# Patient Record
Sex: FEMALE | Race: ASIAN | NOT HISPANIC OR LATINO | URBAN - METROPOLITAN AREA
[De-identification: names, ages, dates, MRNs, and addresses within clinical notes are randomized per-mention and may not be internally consistent; named-entity substitution may affect disease eponyms.]

---

## 2018-08-28 ENCOUNTER — EMERGENCY (EMERGENCY)
Facility: HOSPITAL | Age: 28
LOS: 1 days | Discharge: HOME | End: 2018-08-28
Attending: EMERGENCY MEDICINE | Admitting: EMERGENCY MEDICINE
Payer: COMMERCIAL

## 2018-08-28 VITALS
HEART RATE: 86 BPM | OXYGEN SATURATION: 100 % | RESPIRATION RATE: 18 BRPM | DIASTOLIC BLOOD PRESSURE: 64 MMHG | SYSTOLIC BLOOD PRESSURE: 109 MMHG

## 2018-08-28 VITALS
DIASTOLIC BLOOD PRESSURE: 81 MMHG | SYSTOLIC BLOOD PRESSURE: 119 MMHG | TEMPERATURE: 99 F | HEART RATE: 71 BPM | RESPIRATION RATE: 18 BRPM

## 2018-08-28 LAB
ALBUMIN SERPL ELPH-MCNC: 4.5 G/DL — SIGNIFICANT CHANGE UP (ref 3.3–5)
ALP SERPL-CCNC: 47 U/L — SIGNIFICANT CHANGE UP (ref 40–120)
ALT FLD-CCNC: 14 U/L — SIGNIFICANT CHANGE UP (ref 4–33)
APPEARANCE UR: CLEAR — SIGNIFICANT CHANGE UP
AST SERPL-CCNC: 22 U/L — SIGNIFICANT CHANGE UP (ref 4–32)
BASOPHILS # BLD AUTO: 0.03 K/UL — SIGNIFICANT CHANGE UP (ref 0–0.2)
BASOPHILS NFR BLD AUTO: 0.3 % — SIGNIFICANT CHANGE UP (ref 0–2)
BILIRUB SERPL-MCNC: 0.2 MG/DL — SIGNIFICANT CHANGE UP (ref 0.2–1.2)
BILIRUB UR-MCNC: NEGATIVE — SIGNIFICANT CHANGE UP
BLD GP AB SCN SERPL QL: NEGATIVE — SIGNIFICANT CHANGE UP
BLOOD UR QL VISUAL: NEGATIVE — SIGNIFICANT CHANGE UP
BUN SERPL-MCNC: 15 MG/DL — SIGNIFICANT CHANGE UP (ref 7–23)
CALCIUM SERPL-MCNC: 9.4 MG/DL — SIGNIFICANT CHANGE UP (ref 8.4–10.5)
CHLORIDE SERPL-SCNC: 104 MMOL/L — SIGNIFICANT CHANGE UP (ref 98–107)
CO2 SERPL-SCNC: 24 MMOL/L — SIGNIFICANT CHANGE UP (ref 22–31)
COLOR SPEC: SIGNIFICANT CHANGE UP
CREAT SERPL-MCNC: 0.65 MG/DL — SIGNIFICANT CHANGE UP (ref 0.5–1.3)
EOSINOPHIL # BLD AUTO: 0.09 K/UL — SIGNIFICANT CHANGE UP (ref 0–0.5)
EOSINOPHIL NFR BLD AUTO: 1 % — SIGNIFICANT CHANGE UP (ref 0–6)
GLUCOSE SERPL-MCNC: 97 MG/DL — SIGNIFICANT CHANGE UP (ref 70–99)
GLUCOSE UR-MCNC: NEGATIVE — SIGNIFICANT CHANGE UP
HCG SERPL-ACNC: < 5 MIU/ML — SIGNIFICANT CHANGE UP
HCT VFR BLD CALC: 37.1 % — SIGNIFICANT CHANGE UP (ref 34.5–45)
HGB BLD-MCNC: 12.2 G/DL — SIGNIFICANT CHANGE UP (ref 11.5–15.5)
IMM GRANULOCYTES # BLD AUTO: 0.04 # — SIGNIFICANT CHANGE UP
IMM GRANULOCYTES NFR BLD AUTO: 0.4 % — SIGNIFICANT CHANGE UP (ref 0–1.5)
KETONES UR-MCNC: NEGATIVE — SIGNIFICANT CHANGE UP
LEUKOCYTE ESTERASE UR-ACNC: NEGATIVE — SIGNIFICANT CHANGE UP
LYMPHOCYTES # BLD AUTO: 2.24 K/UL — SIGNIFICANT CHANGE UP (ref 1–3.3)
LYMPHOCYTES # BLD AUTO: 24 % — SIGNIFICANT CHANGE UP (ref 13–44)
MCHC RBC-ENTMCNC: 28.6 PG — SIGNIFICANT CHANGE UP (ref 27–34)
MCHC RBC-ENTMCNC: 32.9 % — SIGNIFICANT CHANGE UP (ref 32–36)
MCV RBC AUTO: 86.9 FL — SIGNIFICANT CHANGE UP (ref 80–100)
MONOCYTES # BLD AUTO: 0.61 K/UL — SIGNIFICANT CHANGE UP (ref 0–0.9)
MONOCYTES NFR BLD AUTO: 6.5 % — SIGNIFICANT CHANGE UP (ref 2–14)
NEUTROPHILS # BLD AUTO: 6.32 K/UL — SIGNIFICANT CHANGE UP (ref 1.8–7.4)
NEUTROPHILS NFR BLD AUTO: 67.8 % — SIGNIFICANT CHANGE UP (ref 43–77)
NITRITE UR-MCNC: NEGATIVE — SIGNIFICANT CHANGE UP
NRBC # FLD: 0 — SIGNIFICANT CHANGE UP
PH UR: 7 — SIGNIFICANT CHANGE UP (ref 5–8)
PLATELET # BLD AUTO: 172 K/UL — SIGNIFICANT CHANGE UP (ref 150–400)
PMV BLD: 11.6 FL — SIGNIFICANT CHANGE UP (ref 7–13)
POTASSIUM SERPL-MCNC: 4.3 MMOL/L — SIGNIFICANT CHANGE UP (ref 3.5–5.3)
POTASSIUM SERPL-SCNC: 4.3 MMOL/L — SIGNIFICANT CHANGE UP (ref 3.5–5.3)
PROT SERPL-MCNC: 7.6 G/DL — SIGNIFICANT CHANGE UP (ref 6–8.3)
PROT UR-MCNC: NEGATIVE — SIGNIFICANT CHANGE UP
RBC # BLD: 4.27 M/UL — SIGNIFICANT CHANGE UP (ref 3.8–5.2)
RBC # FLD: 12 % — SIGNIFICANT CHANGE UP (ref 10.3–14.5)
RH IG SCN BLD-IMP: POSITIVE — SIGNIFICANT CHANGE UP
SODIUM SERPL-SCNC: 139 MMOL/L — SIGNIFICANT CHANGE UP (ref 135–145)
SP GR SPEC: 1.01 — SIGNIFICANT CHANGE UP (ref 1–1.04)
UROBILINOGEN FLD QL: NORMAL — SIGNIFICANT CHANGE UP
WBC # BLD: 9.33 K/UL — SIGNIFICANT CHANGE UP (ref 3.8–10.5)
WBC # FLD AUTO: 9.33 K/UL — SIGNIFICANT CHANGE UP (ref 3.8–10.5)

## 2018-08-28 PROCEDURE — 76830 TRANSVAGINAL US NON-OB: CPT | Mod: 26

## 2018-08-28 PROCEDURE — 99285 EMERGENCY DEPT VISIT HI MDM: CPT

## 2018-08-28 RX ORDER — KETOROLAC TROMETHAMINE 30 MG/ML
15 SYRINGE (ML) INJECTION ONCE
Qty: 0 | Refills: 0 | Status: DISCONTINUED | OUTPATIENT
Start: 2018-08-28 | End: 2018-08-28

## 2018-08-28 RX ORDER — SODIUM CHLORIDE 9 MG/ML
1000 INJECTION INTRAMUSCULAR; INTRAVENOUS; SUBCUTANEOUS ONCE
Qty: 0 | Refills: 0 | Status: COMPLETED | OUTPATIENT
Start: 2018-08-28 | End: 2018-08-28

## 2018-08-28 RX ADMIN — SODIUM CHLORIDE 1000 MILLILITER(S): 9 INJECTION INTRAMUSCULAR; INTRAVENOUS; SUBCUTANEOUS at 17:02

## 2018-08-28 RX ADMIN — SODIUM CHLORIDE 1000 MILLILITER(S): 9 INJECTION INTRAMUSCULAR; INTRAVENOUS; SUBCUTANEOUS at 21:20

## 2018-08-28 RX ADMIN — Medication 15 MILLIGRAM(S): at 21:19

## 2018-08-28 NOTE — ED PROVIDER NOTE - CARE PLAN
Principal Discharge DX:	Ovarian cyst  Assessment and plan of treatment:	Advance activity as tolerated.  Continue all previously prescribed medications as directed unless otherwise instructed.  Take Motrin (also sold as Advil or Ibuprofen) 400-600 mg (two or three 200 mg over the counter pills) every 8 hours as needed for moderate pain or fevers-- take with food. Take Tylenol 650mg (Two 325 mg pills) every 4-6 hours as needed for pain or fevers. Follow up with your primary care physician and OB/GYN in 48-72 hours- bring copies of your results.  Return to the ER for worsening or persistent symptoms, including but not limited to worsening/persistent pain, fevers, and/or ANY NEW OR CONCERNING SYMPTOMS. If you have issues obtaining follow up, please call: [phone] to obtain a doctor or specialist who takes your insurance in your area.  You may call [phone] to make an appointment with the internal medicine clinic.

## 2018-08-28 NOTE — ED PROVIDER NOTE - PLAN OF CARE
Advance activity as tolerated.  Continue all previously prescribed medications as directed unless otherwise instructed.  Take Motrin (also sold as Advil or Ibuprofen) 400-600 mg (two or three 200 mg over the counter pills) every 8 hours as needed for moderate pain or fevers-- take with food. Take Tylenol 650mg (Two 325 mg pills) every 4-6 hours as needed for pain or fevers. Follow up with your primary care physician and OB/GYN in 48-72 hours- bring copies of your results.  Return to the ER for worsening or persistent symptoms, including but not limited to worsening/persistent pain, fevers, and/or ANY NEW OR CONCERNING SYMPTOMS. If you have issues obtaining follow up, please call: [phone] to obtain a doctor or specialist who takes your insurance in your area.  You may call [phone] to make an appointment with the internal medicine clinic.

## 2018-08-28 NOTE — ED PROVIDER NOTE - MEDICAL DECISION MAKING DETAILS
Pt is a 27 y/o F nonsmoker no PMHx  p/w vaginal bleeding x 1 hr. -- possible miscarriage, possible ectopic pregnancy, although pt had bedside sono yesterday with ob/gyn and was not notified of ectopic pregnancy, but had supposed visualized "sac" -- labs, type and screen, ua, ucx, transvaginal sono

## 2018-08-28 NOTE — ED PROVIDER NOTE - CHPI ED SYMPTOMS NEG
no back pain/no chills/no vaginal discharge/no vomiting/no abdominal pain/no nausea/no pain/no discharge/no dysuria/no fever

## 2018-08-28 NOTE — ED PROVIDER NOTE - NONTENDER LOCATION
left upper quadrant/right upper quadrant/right lower quadrant/umbilical/right costovertebral angle/periumbilical/left costovertebral angle/suprapubic/left lower quadrant

## 2018-08-28 NOTE — ED PROVIDER NOTE - PROGRESS NOTE DETAILS
[place]: Patient signed out to me.  28F 5 weeks pregnant, sac seen on US yesterday, here with spotting.  Labs significant for beta<5, hgb 12, UA neg, type AB+.  Pending read on TVUS.  Likely missed Ab. ONESIMO CRUZ:  Pt with significant improvement in pain.  VSS.  Sono shows left ovarian cyst.  At this time, not clinically concerning for active ovarian torsion.  OB/GYN consulted who saw patient.  Awaiting final recommendation from OB/GYN at this time.

## 2018-08-28 NOTE — ED PROVIDER NOTE - ATTENDING CONTRIBUTION TO CARE
Dr. Yates: 27 yo female with no sig PMH,  (LMP  with positive home pregnancy test), in ED with low abdominal pain and vaginal spotting.  Pt states that after she had positive pregnancy test at home she saw her PMD Dr. Cortes yesterday.  Dr. Cortes performed TVUS showing a "sac" but was told that it was too early to further characterize.  Today pt received a phone call saying that her "?progesterone" level was low.  Then today pt developed low abdominal cramping ("feels like a period") and vaginal spotting and came to ED for evaluation.  In ED initially pt with reassuring BP and then BP dropped on its own.  Second IV was placed and unofficial FAST was performed showing no obvious free fluid.  BP resolved on its own with IVF and time and pt noted feeling improvement.  Beta was <5 in ED.  On exam pt uncomfortable appearing, in mild distress due to abdominal pain, heart RRR, lungs CTAB, abd suprapubic TTP, extremities without swelling, strength 5/5 in all extremities and skin without rash.

## 2018-08-28 NOTE — ED PROVIDER NOTE - OBJECTIVE STATEMENT
Pt is a 29 y/o F nonsmoker no PMHx  p/w vaginal bleeding x 1 hr.  Pt states LMP 18.  Pt had + home pregnancy test at home, which was confirmed in blood work yesterday with OB/GYN Dr. Cortes.  Pt states she got a call today and was told her Progesterone level was lower than expected.  For past hour, pt states she has had intermittent vaginal spotting, most recently passing a small blood clot.  Denies any fevers, chills, nausea, vomiting, dysuria, cloudy urine, pelvic pain, vaginal discharge, abdominal pain, lightheadedness, use of blood thinners, melena, brbpr.  Pt states she had bedside sono yesterday and was told she had a "sac,"

## 2018-08-29 DIAGNOSIS — N93.9 ABNORMAL UTERINE AND VAGINAL BLEEDING, UNSPECIFIED: ICD-10-CM

## 2018-08-29 NOTE — CONSULT NOTE ADULT - SUBJECTIVE AND OBJECTIVE BOX
GYN Consult Note   (Back Note to time of exam at 21:30 due to patient care)    28y  w/ LMP  presents with vaginal bleeding that began this afternoon. Patient reports taking 10 home pregnancy tests, some of which were "weakly positive." She was seen in the office by Dr. Cortes yesterday where she had a bHCG drawn and an ultrasound which, per the patient, showed a possible sac. Patient reports wearing one pad since 12pm but that the bleeding is now like a menstrual period. She denied any pelvic or abdominal pain, however, while patient was here she reported a sudden onset suprapubic cramping pain that lasted about 30 minutes and was associated with dizziness. This resolved spontaneously and the ED did a negative FAST scan. Patient reports no pain at this time. Denies any fevers, chills, chest pain, SOB, N/V, constipation, diarrhea, dysuria.     OB/GYN HISTORY: . Denies any uterine fibroids, ovarian cysts, abnormal pap smears. Reports normal menses q30d lasting ~5 days.     Last Menstrual Period:      Name of GYN Physician: Sophia        PAST MEDICAL & SURGICAL HISTORY:  No pertinent past medical history  No significant past surgical history      REVIEW OF SYSTEMS  +Vaginal bleeding   Denies any fevers, chills, chest pain, SOB, N/V, constipation, diarrhea, dysuria.     MEDICATIONS  (STANDING):    MEDICATIONS  (PRN):      Allergies    No Known Allergies    Intolerances      Vital Signs Last 24 Hrs  T(C): 36.1 (28 Aug 2018 16:18), Max: 37 (28 Aug 2018 14:06)  T(F): 97 (28 Aug 2018 16:18), Max: 98.6 (28 Aug 2018 14:06)  HR: 86 (28 Aug 2018 20:50) (54 - 86)  BP: 109/64 (28 Aug 2018 20:50) (84/50 - 119/81)  BP(mean): --  RR: 18 (28 Aug 2018 20:50) (18 - 18)  SpO2: 100% (28 Aug 2018 20:50) (100% - 100%)    PHYSICAL EXAM:    Constitutional: alert and oriented x 3    Gastrointestinal: soft, non tender, no rebuond or guarding     Genitourinary: blood noted within vault, cleared and os closed; no active hemorrhage or pooling   Cervix: closed/ long, no CMT  Uterus: normal size, non tender  Adnexa: non tender, no palpable masses          LABS:                        12.2   9.33  )-----------( 172      ( 28 Aug 2018 15:22 )             37.1         139  |  104  |  15  ----------------------------<  97  4.3   |  24  |  0.65    Ca    9.4      28 Aug 2018 15:22    TPro  7.6  /  Alb  4.5  /  TBili  0.2  /  DBili  x   /  AST  22  /  ALT  14  /  AlkPhos  47        Urinalysis Basic - ( 28 Aug 2018 15:03 )    Color: LIGHT YELLOW / Appearance: CLEAR / S.010 / pH: 7.0  Gluc: NEGATIVE / Ketone: NEGATIVE  / Bili: NEGATIVE / Urobili: NORMAL   Blood: NEGATIVE / Protein: NEGATIVE / Nitrite: NEGATIVE   Leuk Esterase: NEGATIVE / RBC: x / WBC x   Sq Epi: x / Non Sq Epi: x / Bacteria: x    bHCG <5    RADIOLOGY & ADDITIONAL STUDIES:    < from: US Transvaginal (18 @ 19:27) >  EXAM:  US TRANSVAGINAL        PROCEDURE DATE:  Aug 28 2018         INTERPRETATION:  CLINICAL INFORMATION: Vaginal bleeding, negative serum   beta-hCG    LMP: 2018    COMPARISON: None available.    TECHNIQUE:     Endovaginal pelvic sonogram only. Color and Spectral Doppler was   performed.    FINDINGS:    Uterus: 7.9 x 4.3 x 4.4 cm. Within normal limits.    Endometrium: 14 mm. Within normal limits.    Right ovary: 2.1 x 2.1 x 1.5 cm. Normal blood flow.    Left ovary: 4.3 x 3.7 x 4.3 cm. Normal blood flow. There is a simple   appearing cyst measuring 3.5 x 3.6 x 3.9 cm.    Fluid: None.    IMPRESSION:     Simple appearing left ovarian cyst.    If symptoms persists consider further evaluation with sonohysterogram.          FRANCIS VELIZ M.D., RADIOLOGY RESIDENT  This document has been electronically signed.  BRYNN JONES M.D., ATTENDING RADIOLOGIST [phone]  This document has been electronically signed. Aug 28 2018  8:24PM                  < end of copied text >

## 2018-08-29 NOTE — CONSULT NOTE ADULT - PROBLEM SELECTOR RECOMMENDATION 9
- bHCG <5, consistent with chemical pregnancy, as bHCG previously elevated. Patient with no pain or tenderness on exam. No active hemorrhage noted and os was closed. Patient counseled on vaginal bleeding and precautions to return to the ED or the office.  - Blood type: AB +     D/w Dr. Sophia Muhammad, PGY-2

## 2018-08-30 LAB
BACTERIA UR CULT: SIGNIFICANT CHANGE UP
SPECIMEN SOURCE: SIGNIFICANT CHANGE UP

## 2019-07-13 ENCOUNTER — INPATIENT (INPATIENT)
Facility: HOSPITAL | Age: 29
LOS: 2 days | Discharge: HOME | End: 2019-07-16
Attending: OBSTETRICS & GYNECOLOGY | Admitting: OBSTETRICS & GYNECOLOGY

## 2019-07-13 VITALS — SYSTOLIC BLOOD PRESSURE: 119 MMHG | HEART RATE: 69 BPM | TEMPERATURE: 98 F | DIASTOLIC BLOOD PRESSURE: 79 MMHG

## 2019-07-13 DIAGNOSIS — O48.0 POST-TERM PREGNANCY: ICD-10-CM

## 2019-07-13 LAB
BASOPHILS # BLD AUTO: 0.01 K/UL — SIGNIFICANT CHANGE UP (ref 0–0.2)
BASOPHILS NFR BLD AUTO: 0.2 % — SIGNIFICANT CHANGE UP (ref 0–2)
BLD GP AB SCN SERPL QL: NEGATIVE — SIGNIFICANT CHANGE UP
EOSINOPHIL # BLD AUTO: 0.07 K/UL — SIGNIFICANT CHANGE UP (ref 0–0.5)
EOSINOPHIL NFR BLD AUTO: 1.1 % — SIGNIFICANT CHANGE UP (ref 0–6)
HCT VFR BLD CALC: 34.8 % — SIGNIFICANT CHANGE UP (ref 34.5–45)
HGB BLD-MCNC: 11.6 G/DL — SIGNIFICANT CHANGE UP (ref 11.5–15.5)
IMM GRANULOCYTES NFR BLD AUTO: 0.5 % — SIGNIFICANT CHANGE UP (ref 0–1.5)
LYMPHOCYTES # BLD AUTO: 1.7 K/UL — SIGNIFICANT CHANGE UP (ref 1–3.3)
LYMPHOCYTES # BLD AUTO: 25.6 % — SIGNIFICANT CHANGE UP (ref 13–44)
MCHC RBC-ENTMCNC: 31.6 PG — SIGNIFICANT CHANGE UP (ref 27–34)
MCHC RBC-ENTMCNC: 33.3 % — SIGNIFICANT CHANGE UP (ref 32–36)
MCV RBC AUTO: 94.8 FL — SIGNIFICANT CHANGE UP (ref 80–100)
MONOCYTES # BLD AUTO: 0.63 K/UL — SIGNIFICANT CHANGE UP (ref 0–0.9)
MONOCYTES NFR BLD AUTO: 9.5 % — SIGNIFICANT CHANGE UP (ref 2–14)
NEUTROPHILS # BLD AUTO: 4.19 K/UL — SIGNIFICANT CHANGE UP (ref 1.8–7.4)
NEUTROPHILS NFR BLD AUTO: 63.1 % — SIGNIFICANT CHANGE UP (ref 43–77)
NRBC # FLD: 0 K/UL — SIGNIFICANT CHANGE UP (ref 0–0)
PLATELET # BLD AUTO: 139 K/UL — LOW (ref 150–400)
PMV BLD: 13.1 FL — HIGH (ref 7–13)
RBC # BLD: 3.67 M/UL — LOW (ref 3.8–5.2)
RBC # FLD: 13.6 % — SIGNIFICANT CHANGE UP (ref 10.3–14.5)
RH IG SCN BLD-IMP: POSITIVE — SIGNIFICANT CHANGE UP
WBC # BLD: 6.63 K/UL — SIGNIFICANT CHANGE UP (ref 3.8–10.5)
WBC # FLD AUTO: 6.63 K/UL — SIGNIFICANT CHANGE UP (ref 3.8–10.5)

## 2019-07-13 RX ORDER — OXYTOCIN 10 UNIT/ML
333.33 VIAL (ML) INJECTION
Qty: 20 | Refills: 0 | Status: DISCONTINUED | OUTPATIENT
Start: 2019-07-13 | End: 2019-07-14

## 2019-07-13 RX ORDER — BUTORPHANOL TARTRATE 2 MG/ML
2 INJECTION, SOLUTION INTRAMUSCULAR; INTRAVENOUS ONCE
Refills: 0 | Status: DISCONTINUED | OUTPATIENT
Start: 2019-07-13 | End: 2019-07-14

## 2019-07-13 RX ORDER — CITRIC ACID/SODIUM CITRATE 300-500 MG
15 SOLUTION, ORAL ORAL EVERY 6 HOURS
Refills: 0 | Status: DISCONTINUED | OUTPATIENT
Start: 2019-07-13 | End: 2019-07-13

## 2019-07-13 RX ORDER — SODIUM CHLORIDE 9 MG/ML
1000 INJECTION, SOLUTION INTRAVENOUS
Refills: 0 | Status: DISCONTINUED | OUTPATIENT
Start: 2019-07-13 | End: 2019-07-13

## 2019-07-13 RX ORDER — CITRIC ACID/SODIUM CITRATE 300-500 MG
15 SOLUTION, ORAL ORAL EVERY 6 HOURS
Refills: 0 | Status: DISCONTINUED | OUTPATIENT
Start: 2019-07-13 | End: 2019-07-14

## 2019-07-13 RX ORDER — OXYTOCIN 10 UNIT/ML
333.33 VIAL (ML) INJECTION
Qty: 20 | Refills: 0 | Status: DISCONTINUED | OUTPATIENT
Start: 2019-07-13 | End: 2019-07-13

## 2019-07-13 RX ORDER — SODIUM CHLORIDE 9 MG/ML
1000 INJECTION, SOLUTION INTRAVENOUS
Refills: 0 | Status: DISCONTINUED | OUTPATIENT
Start: 2019-07-13 | End: 2019-07-14

## 2019-07-13 RX ADMIN — SODIUM CHLORIDE 125 MILLILITER(S): 9 INJECTION, SOLUTION INTRAVENOUS at 18:37

## 2019-07-13 RX ADMIN — SODIUM CHLORIDE 125 MILLILITER(S): 9 INJECTION, SOLUTION INTRAVENOUS at 04:11

## 2019-07-13 NOTE — CHART NOTE - NSCHARTNOTEFT_GEN_A_CORE
Patient examined at bedside    SVE: 1.5/60/-3  FHT: 145 bpm, mod variability, +acel, -decel  [place]: q2-3    VS  T(C): 36.7 (07-13-19 @ 17:30)  HR: 61 (07-13-19 @ 21:08)  BP: 128/83 (07-13-19 @ 21:08)  RR: 18 (07-13-19 @ 17:30)  SpO2: --    P: Pt requests epi    to be d/w Dr. Belkis Deleon PGY1

## 2019-07-13 NOTE — OB PROVIDER H&P - ASSESSMENT
30 y/o G1PO here for LTIOL.  No complaints.      PNC: uncomplicated  GBS negative  EFW 3800 by palpation     POB: P0   Pgyn: Denies fibroids, cysts, STI's, abnroaml pap smears  PMSH: denies  All: NKDA   SH: denies x 3  Meds: PNV    TAUS :confirmed vertex  SVE: 0/0/-3     /mdo magdy/+accel,-decel  [place]: no contractions     A/P P0 here for LTIOL   -admit to L&D  -IV fluids  -PO cytotec for IOL   -Anesthesia consult prn   -Clear liquid diet  -[place] signed     Jud Aden PGY-4  Plan per Dr. Cortes

## 2019-07-13 NOTE — OB PROVIDER H&P - HISTORY OF PRESENT ILLNESS
28 y/o  at 41wk0d (LAUREN 19) here for late term IOL.   Denies regular contractions, vaginal bleeding, leakage of fluid.  +good fetal movement.     PNC: uncomplicated     EFW 7lb 11 oz (one week ago)   GBS negative per patient

## 2019-07-13 NOTE — OB PROVIDER IHI INDUCTION/AUGMENTATION NOTE - NS_CHECKALL_OBGYN_ALL_OB
FHR was reviewed/Order was written/H&P was completed/Contractions pattern was reviewed/Induction / Augmentation was discussed

## 2019-07-14 LAB — T PALLIDUM AB TITR SER: NEGATIVE — SIGNIFICANT CHANGE UP

## 2019-07-14 RX ORDER — AER TRAVELER 0.5 G/1
1 SOLUTION RECTAL; TOPICAL EVERY 4 HOURS
Refills: 0 | Status: DISCONTINUED | OUTPATIENT
Start: 2019-07-14 | End: 2019-07-16

## 2019-07-14 RX ORDER — SODIUM CHLORIDE 9 MG/ML
3 INJECTION INTRAMUSCULAR; INTRAVENOUS; SUBCUTANEOUS EVERY 8 HOURS
Refills: 0 | Status: DISCONTINUED | OUTPATIENT
Start: 2019-07-14 | End: 2019-07-16

## 2019-07-14 RX ORDER — LANOLIN
1 OINTMENT (GRAM) TOPICAL EVERY 6 HOURS
Refills: 0 | Status: DISCONTINUED | OUTPATIENT
Start: 2019-07-14 | End: 2019-07-16

## 2019-07-14 RX ORDER — OXYCODONE HYDROCHLORIDE 5 MG/1
5 TABLET ORAL
Refills: 0 | Status: DISCONTINUED | OUTPATIENT
Start: 2019-07-14 | End: 2019-07-16

## 2019-07-14 RX ORDER — KETOROLAC TROMETHAMINE 30 MG/ML
30 SYRINGE (ML) INJECTION ONCE
Refills: 0 | Status: DISCONTINUED | OUTPATIENT
Start: 2019-07-14 | End: 2019-07-14

## 2019-07-14 RX ORDER — HYDROCORTISONE 1 %
1 OINTMENT (GRAM) TOPICAL EVERY 6 HOURS
Refills: 0 | Status: DISCONTINUED | OUTPATIENT
Start: 2019-07-14 | End: 2019-07-16

## 2019-07-14 RX ORDER — GLYCERIN ADULT
1 SUPPOSITORY, RECTAL RECTAL AT BEDTIME
Refills: 0 | Status: DISCONTINUED | OUTPATIENT
Start: 2019-07-14 | End: 2019-07-16

## 2019-07-14 RX ORDER — CITRIC ACID/SODIUM CITRATE 300-500 MG
15 SOLUTION, ORAL ORAL EVERY 6 HOURS
Refills: 0 | Status: DISCONTINUED | OUTPATIENT
Start: 2019-07-14 | End: 2019-07-14

## 2019-07-14 RX ORDER — SODIUM CHLORIDE 9 MG/ML
1000 INJECTION, SOLUTION INTRAVENOUS
Refills: 0 | Status: DISCONTINUED | OUTPATIENT
Start: 2019-07-14 | End: 2019-07-14

## 2019-07-14 RX ORDER — DOCUSATE SODIUM 100 MG
100 CAPSULE ORAL
Refills: 0 | Status: DISCONTINUED | OUTPATIENT
Start: 2019-07-14 | End: 2019-07-16

## 2019-07-14 RX ORDER — SIMETHICONE 80 MG/1
80 TABLET, CHEWABLE ORAL EVERY 4 HOURS
Refills: 0 | Status: DISCONTINUED | OUTPATIENT
Start: 2019-07-14 | End: 2019-07-16

## 2019-07-14 RX ORDER — MAGNESIUM HYDROXIDE 400 MG/1
30 TABLET, CHEWABLE ORAL
Refills: 0 | Status: DISCONTINUED | OUTPATIENT
Start: 2019-07-14 | End: 2019-07-16

## 2019-07-14 RX ORDER — IBUPROFEN 200 MG
600 TABLET ORAL EVERY 6 HOURS
Refills: 0 | Status: COMPLETED | OUTPATIENT
Start: 2019-07-14 | End: 2020-06-11

## 2019-07-14 RX ORDER — DIPHENHYDRAMINE HCL 50 MG
25 CAPSULE ORAL EVERY 6 HOURS
Refills: 0 | Status: DISCONTINUED | OUTPATIENT
Start: 2019-07-14 | End: 2019-07-16

## 2019-07-14 RX ORDER — PRAMOXINE HYDROCHLORIDE 150 MG/15G
1 AEROSOL, FOAM RECTAL EVERY 4 HOURS
Refills: 0 | Status: DISCONTINUED | OUTPATIENT
Start: 2019-07-14 | End: 2019-07-16

## 2019-07-14 RX ORDER — OXYTOCIN 10 UNIT/ML
333.33 VIAL (ML) INJECTION
Qty: 20 | Refills: 0 | Status: DISCONTINUED | OUTPATIENT
Start: 2019-07-14 | End: 2019-07-15

## 2019-07-14 RX ORDER — OXYTOCIN 10 UNIT/ML
333.33 VIAL (ML) INJECTION
Qty: 20 | Refills: 0 | Status: COMPLETED | OUTPATIENT
Start: 2019-07-14 | End: 2019-07-14

## 2019-07-14 RX ORDER — ACETAMINOPHEN 500 MG
975 TABLET ORAL
Refills: 0 | Status: DISCONTINUED | OUTPATIENT
Start: 2019-07-14 | End: 2019-07-16

## 2019-07-14 RX ORDER — DIBUCAINE 1 %
1 OINTMENT (GRAM) RECTAL EVERY 6 HOURS
Refills: 0 | Status: DISCONTINUED | OUTPATIENT
Start: 2019-07-14 | End: 2019-07-16

## 2019-07-14 RX ORDER — BENZOCAINE 10 %
1 GEL (GRAM) MUCOUS MEMBRANE EVERY 6 HOURS
Refills: 0 | Status: DISCONTINUED | OUTPATIENT
Start: 2019-07-14 | End: 2019-07-16

## 2019-07-14 RX ORDER — TETANUS TOXOID, REDUCED DIPHTHERIA TOXOID AND ACELLULAR PERTUSSIS VACCINE, ADSORBED 5; 2.5; 8; 8; 2.5 [IU]/.5ML; [IU]/.5ML; UG/.5ML; UG/.5ML; UG/.5ML
0.5 SUSPENSION INTRAMUSCULAR ONCE
Refills: 0 | Status: DISCONTINUED | OUTPATIENT
Start: 2019-07-14 | End: 2019-07-16

## 2019-07-14 RX ORDER — OXYTOCIN 10 UNIT/ML
2 VIAL (ML) INJECTION
Qty: 30 | Refills: 0 | Status: DISCONTINUED | OUTPATIENT
Start: 2019-07-14 | End: 2019-07-15

## 2019-07-14 RX ORDER — OXYCODONE HYDROCHLORIDE 5 MG/1
5 TABLET ORAL ONCE
Refills: 0 | Status: DISCONTINUED | OUTPATIENT
Start: 2019-07-14 | End: 2019-07-16

## 2019-07-14 RX ADMIN — Medication 2 MILLIUNIT(S)/MIN: at 11:30

## 2019-07-14 RX ADMIN — SODIUM CHLORIDE 3 MILLILITER(S): 9 INJECTION INTRAMUSCULAR; INTRAVENOUS; SUBCUTANEOUS at 21:30

## 2019-07-14 RX ADMIN — Medication 30 MILLIGRAM(S): at 16:36

## 2019-07-14 RX ADMIN — Medication 1000 MILLIUNIT(S)/MIN: at 16:37

## 2019-07-14 RX ADMIN — BUTORPHANOL TARTRATE 2 MILLIGRAM(S): 2 INJECTION, SOLUTION INTRAMUSCULAR; INTRAVENOUS at 00:30

## 2019-07-14 RX ADMIN — BUTORPHANOL TARTRATE 2 MILLIGRAM(S): 2 INJECTION, SOLUTION INTRAMUSCULAR; INTRAVENOUS at 00:08

## 2019-07-14 RX ADMIN — Medication 30 MILLIGRAM(S): at 16:38

## 2019-07-14 NOTE — CHART NOTE - NSCHARTNOTEFT_GEN_A_CORE
Pt seen and examined    VS  T(C): 36.8 (07-14-19 @ 10:36)  HR: 66 (07-14-19 @ 10:59)  BP: 104/64 (07-14-19 @ 11:04)  RR: 16 (07-14-19 @ 06:16)  SpO2: 94% (07-14-19 @ 11:04)    VE: 5/80/-1  FHT: 150, mod magdy, no accels, no decel  [place]: q3-5    P:  - pitocin for continued induction    d/w Dr. Sophia Zaman, PGY-3

## 2019-07-14 NOTE — OB PROVIDER DELIVERY SUMMARY - NSPROVIDERDELIVERYNOTE_OBGYN_ALL_OB_FT
of a live female infant born in good condition apgars  9/9. Placenta delivered without complication. N0mbiva episiotomy repaired.    No increase in risk of bleeding infection or DVT

## 2019-07-14 NOTE — OB NEONATOLOGY/PEDIATRICIAN DELIVERY SUMMARY - NSPEDSNEONOTESA_OBGYN_ALL_OB_FT
41+2 WGA IOL for post-dates. Pregnancy uncomplicated. MBT AB+, PNL NNI, GBS . Infant delivered via  with ROM 4 hours PTD, light terminal meconium at delivery. DCC performed for 45 seconds. Infant received routine recusication. Apgars 8/9

## 2019-07-15 RX ORDER — IBUPROFEN 200 MG
1 TABLET ORAL
Qty: 0 | Refills: 0 | DISCHARGE
Start: 2019-07-15

## 2019-07-15 RX ORDER — ACETAMINOPHEN 500 MG
3 TABLET ORAL
Qty: 0 | Refills: 0 | DISCHARGE
Start: 2019-07-15

## 2019-07-15 RX ORDER — IBUPROFEN 200 MG
600 TABLET ORAL EVERY 6 HOURS
Refills: 0 | Status: DISCONTINUED | OUTPATIENT
Start: 2019-07-15 | End: 2019-07-16

## 2019-07-15 RX ADMIN — Medication 975 MILLIGRAM(S): at 06:43

## 2019-07-15 RX ADMIN — SODIUM CHLORIDE 3 MILLILITER(S): 9 INJECTION INTRAMUSCULAR; INTRAVENOUS; SUBCUTANEOUS at 06:15

## 2019-07-15 RX ADMIN — Medication 975 MILLIGRAM(S): at 07:15

## 2019-07-15 NOTE — LACTATION INITIAL EVALUATION - LACTATION INTERVENTIONS
initiate skin to skin/initiate hand expression routine/assisted with deep latch and positioning  discussed  signs  of  effective  feeding and  swallowing.  discussed  compression at  breast when  nbn  stops  drinking  and  is  still sucking.  instructed  to offer both  breast at a feeding ,feed on cue and safe  skin to skin.

## 2019-07-16 ENCOUNTER — TRANSCRIPTION ENCOUNTER (OUTPATIENT)
Age: 29
End: 2019-07-16

## 2019-07-16 VITALS
OXYGEN SATURATION: 99 % | HEART RATE: 87 BPM | SYSTOLIC BLOOD PRESSURE: 109 MMHG | TEMPERATURE: 98 F | RESPIRATION RATE: 18 BRPM | DIASTOLIC BLOOD PRESSURE: 69 MMHG

## 2019-07-16 RX ADMIN — Medication 600 MILLIGRAM(S): at 09:39

## 2019-07-16 RX ADMIN — Medication 600 MILLIGRAM(S): at 00:35

## 2019-07-16 RX ADMIN — Medication 100 MILLIGRAM(S): at 09:39

## 2019-07-16 RX ADMIN — Medication 600 MILLIGRAM(S): at 10:30

## 2019-07-16 RX ADMIN — Medication 600 MILLIGRAM(S): at 01:05

## 2019-07-16 NOTE — DISCHARGE NOTE OB - PATIENT PORTAL LINK FT
You can access the [facility], offered by [facility], by registering with the following website: [email or web address]

## 2019-07-16 NOTE — DISCHARGE NOTE OB - NS OB DC PAIN RELIEF
Tylenol for minor pain as directed/If you have episitomy or tear repair, use warm water sitz bath for relief of discomfort/If you have episitomy or tear repair, use anesthetic spray or cream as directed by your healthcare provider for relief of discomfort/Other pain medication as prescribed and directed

## 2019-07-16 NOTE — DISCHARGE NOTE OB - MATERIALS PROVIDED
[facility] [place] Screening Program/Breastfeeding Log/Discharge Medication Information for Patients and Families Pocket Guide/Back To Sleep Handout/Guide to Postpartum Care/[facility] Hearing Screen Program/Shaken Baby Prevention Handout/Breastfeeding Guide and Packet/[place]  Immunization Record/Breastfeeding Mother’s Support Group Information/Birth Certificate Instructions

## 2019-07-16 NOTE — DISCHARGE NOTE OB - MEDICATION SUMMARY - MEDICATIONS TO TAKE
I will START or STAY ON the medications listed below when I get home from the hospital:    acetaminophen 325 mg oral tablet  -- 3 tab(s) by mouth   -- Indication: For Post term pregnancy    ibuprofen 600 mg oral tablet  -- 1 tab(s) by mouth every 6 hours  -- Indication: For Post term pregnancy    Prenatal Multivitamins with Folic Acid 1 mg oral tablet  -- 1 tab(s) by mouth once a day  -- Indication: For Post term pregnancy

## 2019-10-25 ENCOUNTER — APPOINTMENT (OUTPATIENT)
Dept: OBGYN | Facility: CLINIC | Age: 29
End: 2019-10-25
Payer: COMMERCIAL

## 2019-10-25 PROCEDURE — 99395 PREV VISIT EST AGE 18-39: CPT

## 2019-10-28 ENCOUNTER — APPOINTMENT (OUTPATIENT)
Dept: OBGYN | Facility: CLINIC | Age: 29
End: 2019-10-28

## 2019-11-21 ENCOUNTER — APPOINTMENT (OUTPATIENT)
Dept: OBGYN | Facility: CLINIC | Age: 29
End: 2019-11-21

## 2020-08-06 ENCOUNTER — APPOINTMENT (OUTPATIENT)
Dept: OBGYN | Facility: CLINIC | Age: 30
End: 2020-08-06
Payer: COMMERCIAL

## 2020-08-06 PROCEDURE — 76801 OB US < 14 WKS SINGLE FETUS: CPT

## 2020-08-10 ENCOUNTER — APPOINTMENT (OUTPATIENT)
Dept: OBGYN | Facility: CLINIC | Age: 30
End: 2020-08-10
Payer: COMMERCIAL

## 2020-08-10 PROCEDURE — 76817 TRANSVAGINAL US OBSTETRIC: CPT

## 2020-08-25 ENCOUNTER — APPOINTMENT (OUTPATIENT)
Dept: ANTEPARTUM | Facility: CLINIC | Age: 30
End: 2020-08-25
Payer: COMMERCIAL

## 2020-08-25 PROCEDURE — 76830 TRANSVAGINAL US NON-OB: CPT

## 2020-08-25 PROCEDURE — 76856 US EXAM PELVIC COMPLETE: CPT

## 2020-12-11 PROBLEM — Z00.00 ENCOUNTER FOR PREVENTIVE HEALTH EXAMINATION: Status: ACTIVE | Noted: 2020-12-11

## 2021-01-16 ENCOUNTER — APPOINTMENT (OUTPATIENT)
Dept: OBGYN | Facility: CLINIC | Age: 31
End: 2021-01-16
Payer: COMMERCIAL

## 2021-01-16 PROCEDURE — 99385 PREV VISIT NEW AGE 18-39: CPT

## 2021-01-16 PROCEDURE — 99072 ADDL SUPL MATRL&STAF TM PHE: CPT

## 2021-01-29 ENCOUNTER — APPOINTMENT (OUTPATIENT)
Dept: ANTEPARTUM | Facility: CLINIC | Age: 31
End: 2021-01-29
Payer: COMMERCIAL

## 2021-01-29 ENCOUNTER — APPOINTMENT (OUTPATIENT)
Dept: OBGYN | Facility: CLINIC | Age: 31
End: 2021-01-29
Payer: COMMERCIAL

## 2021-01-29 PROCEDURE — 76801 OB US < 14 WKS SINGLE FETUS: CPT

## 2021-01-29 PROCEDURE — 99072 ADDL SUPL MATRL&STAF TM PHE: CPT

## 2021-01-29 PROCEDURE — 76817 TRANSVAGINAL US OBSTETRIC: CPT

## 2021-01-29 PROCEDURE — 0502F SUBSEQUENT PRENATAL CARE: CPT

## 2021-02-22 ENCOUNTER — APPOINTMENT (OUTPATIENT)
Dept: ANTEPARTUM | Facility: CLINIC | Age: 31
End: 2021-02-22
Payer: COMMERCIAL

## 2021-02-22 ENCOUNTER — APPOINTMENT (OUTPATIENT)
Dept: OBGYN | Facility: CLINIC | Age: 31
End: 2021-02-22
Payer: COMMERCIAL

## 2021-02-22 PROCEDURE — 99072 ADDL SUPL MATRL&STAF TM PHE: CPT

## 2021-02-22 PROCEDURE — 0502F SUBSEQUENT PRENATAL CARE: CPT

## 2021-02-22 PROCEDURE — 76813 OB US NUCHAL MEAS 1 GEST: CPT

## 2021-03-06 ENCOUNTER — APPOINTMENT (OUTPATIENT)
Dept: OBGYN | Facility: CLINIC | Age: 31
End: 2021-03-06
Payer: COMMERCIAL

## 2021-03-06 PROCEDURE — 0502F SUBSEQUENT PRENATAL CARE: CPT

## 2021-03-15 ENCOUNTER — APPOINTMENT (OUTPATIENT)
Dept: OBGYN | Facility: CLINIC | Age: 31
End: 2021-03-15
Payer: COMMERCIAL

## 2021-03-15 PROCEDURE — 76946 ECHO GUIDE FOR AMNIOCENTESIS: CPT

## 2021-03-15 PROCEDURE — 99072 ADDL SUPL MATRL&STAF TM PHE: CPT

## 2021-03-15 PROCEDURE — 76816 OB US FOLLOW-UP PER FETUS: CPT

## 2021-03-15 PROCEDURE — 59000 AMNIOCENTESIS DIAGNOSTIC: CPT

## 2021-03-22 ENCOUNTER — APPOINTMENT (OUTPATIENT)
Dept: OBGYN | Facility: CLINIC | Age: 31
End: 2021-03-22

## 2021-03-29 ENCOUNTER — TRANSCRIPTION ENCOUNTER (OUTPATIENT)
Age: 31
End: 2021-03-29

## 2021-03-29 ENCOUNTER — APPOINTMENT (OUTPATIENT)
Dept: OBGYN | Facility: CLINIC | Age: 31
End: 2021-03-29
Payer: COMMERCIAL

## 2021-03-29 PROCEDURE — 0502F SUBSEQUENT PRENATAL CARE: CPT

## 2021-04-12 ENCOUNTER — APPOINTMENT (OUTPATIENT)
Dept: ANTEPARTUM | Facility: CLINIC | Age: 31
End: 2021-04-12
Payer: COMMERCIAL

## 2021-04-12 PROCEDURE — 76811 OB US DETAILED SNGL FETUS: CPT

## 2021-04-12 PROCEDURE — 99072 ADDL SUPL MATRL&STAF TM PHE: CPT

## 2021-04-20 ENCOUNTER — OUTPATIENT (OUTPATIENT)
Dept: OUTPATIENT SERVICES | Age: 31
LOS: 1 days | Discharge: HOME | End: 2021-04-20

## 2021-04-21 ENCOUNTER — APPOINTMENT (OUTPATIENT)
Dept: PEDIATRIC CARDIOLOGY | Facility: CLINIC | Age: 31
End: 2021-04-21
Payer: COMMERCIAL

## 2021-04-21 PROCEDURE — 76825 ECHO EXAM OF FETAL HEART: CPT

## 2021-04-21 PROCEDURE — 76820 UMBILICAL ARTERY ECHO: CPT

## 2021-04-21 PROCEDURE — 99203 OFFICE O/P NEW LOW 30 MIN: CPT | Mod: 25

## 2021-04-21 PROCEDURE — 76827 ECHO EXAM OF FETAL HEART: CPT

## 2021-04-21 PROCEDURE — 99072 ADDL SUPL MATRL&STAF TM PHE: CPT

## 2021-04-21 PROCEDURE — 93325 DOPPLER ECHO COLOR FLOW MAPG: CPT | Mod: 59

## 2021-05-10 ENCOUNTER — APPOINTMENT (OUTPATIENT)
Dept: OBGYN | Facility: CLINIC | Age: 31
End: 2021-05-10

## 2021-05-15 ENCOUNTER — APPOINTMENT (OUTPATIENT)
Dept: OBGYN | Facility: CLINIC | Age: 31
End: 2021-05-15
Payer: COMMERCIAL

## 2021-05-15 PROCEDURE — 0502F SUBSEQUENT PRENATAL CARE: CPT

## 2021-05-27 ENCOUNTER — APPOINTMENT (OUTPATIENT)
Dept: PEDIATRIC CARDIOLOGY | Facility: CLINIC | Age: 31
End: 2021-05-27
Payer: COMMERCIAL

## 2021-05-27 PROCEDURE — 76826 ECHO EXAM OF FETAL HEART: CPT

## 2021-05-27 PROCEDURE — 76820 UMBILICAL ARTERY ECHO: CPT

## 2021-05-27 PROCEDURE — 99213 OFFICE O/P EST LOW 20 MIN: CPT | Mod: 25

## 2021-05-27 PROCEDURE — 76821 MIDDLE CEREBRAL ARTERY ECHO: CPT

## 2021-05-27 PROCEDURE — 76828 ECHO EXAM OF FETAL HEART: CPT

## 2021-05-27 PROCEDURE — 93325 DOPPLER ECHO COLOR FLOW MAPG: CPT | Mod: 59

## 2021-06-01 ENCOUNTER — NON-APPOINTMENT (OUTPATIENT)
Age: 31
End: 2021-06-01

## 2021-06-01 ENCOUNTER — APPOINTMENT (OUTPATIENT)
Dept: OBGYN | Facility: CLINIC | Age: 31
End: 2021-06-01
Payer: COMMERCIAL

## 2021-06-01 PROCEDURE — 0502F SUBSEQUENT PRENATAL CARE: CPT

## 2021-06-15 ENCOUNTER — APPOINTMENT (OUTPATIENT)
Dept: OBGYN | Facility: CLINIC | Age: 31
End: 2021-06-15
Payer: COMMERCIAL

## 2021-06-15 PROCEDURE — 0502F SUBSEQUENT PRENATAL CARE: CPT

## 2021-06-28 ENCOUNTER — APPOINTMENT (OUTPATIENT)
Dept: ANTEPARTUM | Facility: CLINIC | Age: 31
End: 2021-06-28
Payer: COMMERCIAL

## 2021-06-28 PROCEDURE — 76819 FETAL BIOPHYS PROFIL W/O NST: CPT | Mod: 59

## 2021-06-28 PROCEDURE — 76818 FETAL BIOPHYS PROFILE W/NST: CPT

## 2021-07-12 ENCOUNTER — APPOINTMENT (OUTPATIENT)
Dept: OBGYN | Facility: CLINIC | Age: 31
End: 2021-07-12

## 2021-07-21 NOTE — OB RN PATIENT PROFILE - WEIGHT IN KG
Action    Action Taken 3-30: records internal        ED HPI GENERAL MEDICAL PROBLEM





- General


Chief Complaint: General


Stated Complaint: HEADACHE,SORE THROAT,STUFFY NOSE


Time Seen by Provider: 21 08:40


Source of Information: Reports: Patient, RN, RN Notes Reviewed


History Limitations: Reports: No Limitations





- History of Present Illness


INITIAL COMMENTS - FREE TEXT/NARRATIVE: 


Adelaida is a 21 y/o female who presents to the ED via personal vehicle with 

complaints of general malaise, headache, left ear pain, sore throat, and 

rhinorrhea.  The patient reports her symptoms began about one week ago, and have

progressively worsened in that time.  Additionally, she reports upper and mid 

back pain for which she was examined last week by her primary who prescribed her

muscle relaxers; the patient states she has not yet obtained this prescription 

from the pharmacy.  The patient denies fever, shaking chills, chest pain, 

palpitations, shortness of breath, nausea, or diarrhea.  She states she has 

taken multiple doses of ibuprofen and acetaminophen which has offered her no 

alleviation of symptoms.  She attests to smoking one pack of cigarettes per day;

she denies alcohol or recreational drug use. 





  ** Neck


Pain Score (Numeric/FACES): 8





- Related Data


                                    Allergies











Allergy/AdvReac Type Severity Reaction Status Date / Time


 


No Known Allergies Allergy   Verified 21 08:34











Home Meds: 


                                    Home Meds





ALPRAZolam [Alprazolam] 0.25 mg PO ASDIRECTED 21 [History]


Desvenlafaxine Succinate [Desvenlafaxine Succinate ER] 100 mg PO BID 21 

[History]


Phentermine HCl 37.5 mg PO DAILY 21 [History]


busPIRone HCl [Buspirone HCl] 15 mg PO DAILY 21 [History]


levonorgestreL [Mirena] 1 each IY ASDIRECTED 21 [History]











Past Medical History


Gastrointestinal History: Reports: GERD


OB/GYN History: Reports: Pregnancy, Therapeutic 


Psychiatric History: Reports: Anxiety, Depression


Hematologic History: Reports: Anemia





- Infectious Disease History


Infectious Disease History: Reports: Herpes





- Past Surgical History


HEENT Surgical History: Reports: Adenoidectomy, Tonsillectomy


Female  Surgical History: Reports: D&C





Social & Family History





- Family History


Family Medical History: No Pertinent Family History





- Tobacco Use


Tobacco Use Status *Q: Current Every Day Tobacco User


Years of Tobacco use: 10


Packs/Tins Daily: 1





- Caffeine Use


Caffeine Use: Reports: Coffee, Energy Drinks





- Recreational Drug Use


Recreational Drug Use: No





ED ROS GENERAL





- Review of Systems


Review Of Systems: Comprehensive ROS is negative, except as noted in HPI.





ED EXAM, GENERAL





- Physical Exam


Exam: See Below


Exam Limited By: No Limitations


General Appearance: Alert, Anxious, Mild Distress (Tearful), Obese


Eye Exam: Bilateral Eye: Conjunctival Injection (Crying upon examination), EOMI,

Normal Inspection, PERRL


Ears: Normal External Exam.  No: Normal Canal, Normal TMs


Ear Exam: Left Ear: Swelling (TM), TM Red, TM Bulging, Bilateral Ear: Auricle 

Normal, Erythema (To canal and left TM), TM Dull


Nose: Normal Mucosa, No Blood, Clear Rhinorrhea


Throat/Mouth: Normal Voice, No Airway Compromise, Inflammation (Posterior 

oropharynx)


Head: Atraumatic, Normocephalic


Neck: Normal Inspection, Supple, Non-Tender, Full Range of Motion.  No: 

Lymphadenopathy (L), Lymphadenopathy (R)


Respiratory/Chest: No Respiratory Distress, Lungs Clear, Normal Breath Sounds, 

No Accessory Muscle Use, Chest Non-Tender


Cardiovascular: Normal Peripheral Pulses, Regular Rate, Rhythm, No Edema, No 

Gallop, No JVD, No Murmur, No Rub


Peripheral Pulses: 2+: Radial (L), Radial (R)


GI/Abdominal: Normal Bowel Sounds, Soft, Non-Tender, No Abnormal Bruit, No Mass,

Pelvis Stable


 (Female) Exam: Deferred


Rectal (Female) Exam: Deferred


Back Exam: Normal Inspection, Full Range of Motion


Extremities: Normal Inspection, Normal Range of Motion, Non-Tender, No Pedal 

Edema, Normal Capillary Refill


Neurological: Alert, Oriented, CN II-XII Intact, Normal Cognition, Normal Gait, 

No Motor/Sensory Deficits


Psychiatric: Anxious, Tearful


Skin Exam: Warm, Dry, Intact, Normal Color, No Rash.  No: Cyanosis, Jaundice, 

Mottled, Pallor





Course





- Vital Signs


Last Recorded V/S: 


                                Last Vital Signs











Temp  96.8 F L  21 08:28


 


Pulse  99   21 08:28


 


Resp  16   21 08:28


 


BP  135/96 H  21 08:28


 


Pulse Ox  100   21 08:28














- Orders/Labs/Meds


Orders: 


                               Active Orders 24 hr











 Category Date Time Status


 


 CULTURE STREP A CONFIRMATION [RM] Stat Lab  21 08:24 Results


 


 STREP SCRN A RAPID W CULT CONF [RM] Stat Lab  21 08:36 Ordered














- Re-Assessments/Exams


Free Text/Narrative Re-Assessment/Exam: 





21 


Findings of examination and lab work reviewed with patient.  Will treat left AOM

 with amoxicillin-clavulanate. Discussed supportive cares for AOM and general 

malaise. Patient instructed to keep appointment with PCP for 8 days from now and

 to obtain her previous prescription.  Red flag signs and symptoms which would 

warrant reevaluation reviewed.  Patient verbalized understanding and agreement 

with the plan of care.








Departure





- Departure


Time of Disposition: 09:14


Disposition: Home, Self-Care 01


Condition: Good


Clinical Impression: 


Acute otitis media


Qualifiers:


 Otitis media type: suppurative Laterality: left Recurrence: non-recurrent 

Spontaneous tympanic membrane rupture: without spontaneous rupture Qualified 

Code(s): H66.002 - Acute suppurative otitis media without spontaneous rupture of

 ear drum, left ear








- Discharge Information


*PRESCRIPTION DRUG MONITORING PROGRAM REVIEWED*: Not Applicable


*COPY OF PRESCRIPTION DRUG MONITORING REPORT IN PATIENT FAB: Not Applicable


Forms:  ED Department Discharge


Additional Instructions: 


Rx: amoxicillin-clavulanate





1.) Take all of your antibiotic until gone, even as symptoms improve. 


2.) Drink plenty of water and eat a yogurt (or take a probiotic) daily while 

taking antibiotics for gut health. 


3.) Follow up with your primary care provider, as previously scheduled. 


4.) Obtain your previous prescription, per Dr. Chaney.  





Sepsis Event Note (ED)





- Evaluation


Sepsis Screening Result: No Definite Risk





- Focused Exam


Vital Signs: 


                                   Vital Signs











  Temp Pulse Resp BP Pulse Ox


 


 21 08:28  96.8 F L  99  16  135/96 H  100














- My Orders


Last 24 Hours: 


My Active Orders





21 08:24


CULTURE STREP A CONFIRMATION [RM] Stat 





21 08:36


STREP SCRN A RAPID W CULT CONF [RM] Stat 














- Assessment/Plan


Last 24 Hours: 


My Active Orders





21 08:24


CULTURE STREP A CONFIRMATION [RM] Stat 





21 08:36


STREP SCRN A RAPID W CULT CONF [RM] Stat 68.9

## 2021-07-26 ENCOUNTER — APPOINTMENT (OUTPATIENT)
Dept: OBGYN | Facility: CLINIC | Age: 31
End: 2021-07-26
Payer: COMMERCIAL

## 2021-07-26 PROCEDURE — 0502F SUBSEQUENT PRENATAL CARE: CPT

## 2021-08-09 ENCOUNTER — APPOINTMENT (OUTPATIENT)
Dept: OBGYN | Facility: CLINIC | Age: 31
End: 2021-08-09
Payer: COMMERCIAL

## 2021-08-09 ENCOUNTER — APPOINTMENT (OUTPATIENT)
Dept: ANTEPARTUM | Facility: CLINIC | Age: 31
End: 2021-08-09
Payer: COMMERCIAL

## 2021-08-09 PROCEDURE — 76816 OB US FOLLOW-UP PER FETUS: CPT

## 2021-08-09 PROCEDURE — 76818 FETAL BIOPHYS PROFILE W/NST: CPT

## 2021-08-09 PROCEDURE — ZZZZZ: CPT

## 2021-08-16 ENCOUNTER — APPOINTMENT (OUTPATIENT)
Dept: OBGYN | Facility: CLINIC | Age: 31
End: 2021-08-16
Payer: COMMERCIAL

## 2021-08-16 PROCEDURE — 0502F SUBSEQUENT PRENATAL CARE: CPT

## 2021-08-23 ENCOUNTER — APPOINTMENT (OUTPATIENT)
Dept: OBGYN | Facility: CLINIC | Age: 31
End: 2021-08-23
Payer: COMMERCIAL

## 2021-08-23 VITALS
WEIGHT: 148 LBS | BODY MASS INDEX: 29.06 KG/M2 | SYSTOLIC BLOOD PRESSURE: 113 MMHG | DIASTOLIC BLOOD PRESSURE: 77 MMHG | HEIGHT: 60 IN

## 2021-08-23 PROCEDURE — 0502F SUBSEQUENT PRENATAL CARE: CPT

## 2021-08-27 ENCOUNTER — NON-APPOINTMENT (OUTPATIENT)
Age: 31
End: 2021-08-27

## 2021-08-27 DIAGNOSIS — Z34.90 ENCOUNTER FOR SUPERVISION OF NORMAL PREGNANCY, UNSPECIFIED, UNSPECIFIED TRIMESTER: ICD-10-CM

## 2021-08-27 DIAGNOSIS — Z78.9 OTHER SPECIFIED HEALTH STATUS: ICD-10-CM

## 2021-08-27 DIAGNOSIS — Z12.72 ENCOUNTER FOR SCREENING FOR MALIGNANT NEOPLASM OF VAGINA: ICD-10-CM

## 2021-08-27 DIAGNOSIS — O02.81 INAPPROPRIATE CHANGE IN QUANTITATIVE HUMAN CHORIONIC GONADOTROPIN (HCG) IN EARLY PREGNANCY: ICD-10-CM

## 2021-08-27 RX ORDER — CALCIUM CITRATE, IRON PENTACARBONYL, CHOLECALCIFEROL, .ALPHA.-TOCOPHEROL, DL-, PYRIDOXINE HYDROCHLORIDE, FOLIC ACID, DOCUSATE SODIUM, AND DOCONEXENT 104; 27; 400; 30; 25; 1; 50; 260 MG/1; MG/1; [IU]/1; [IU]/1; MG/1; MG/1; MG/1; MG/1
27-1-260 CAPSULE, GELATIN COATED ORAL
Refills: 0 | Status: ACTIVE | COMMUNITY

## 2021-08-30 ENCOUNTER — APPOINTMENT (OUTPATIENT)
Dept: OBGYN | Facility: CLINIC | Age: 31
End: 2021-08-30

## 2021-08-30 VITALS — WEIGHT: 153 LBS | SYSTOLIC BLOOD PRESSURE: 120 MMHG | BODY MASS INDEX: 29.88 KG/M2 | DIASTOLIC BLOOD PRESSURE: 83 MMHG

## 2021-09-07 ENCOUNTER — APPOINTMENT (OUTPATIENT)
Dept: OBGYN | Facility: CLINIC | Age: 31
End: 2021-09-07
Payer: COMMERCIAL

## 2021-09-07 VITALS
HEIGHT: 60 IN | SYSTOLIC BLOOD PRESSURE: 114 MMHG | BODY MASS INDEX: 29.45 KG/M2 | WEIGHT: 150 LBS | DIASTOLIC BLOOD PRESSURE: 75 MMHG

## 2021-09-07 DIAGNOSIS — Z3A.38 38 WEEKS GESTATION OF PREGNANCY: ICD-10-CM

## 2021-09-07 DIAGNOSIS — Z3A.40 40 WEEKS GESTATION OF PREGNANCY: ICD-10-CM

## 2021-09-07 PROCEDURE — 0502F SUBSEQUENT PRENATAL CARE: CPT

## 2021-09-10 DIAGNOSIS — Z01.818 ENCOUNTER FOR OTHER PREPROCEDURAL EXAMINATION: ICD-10-CM

## 2021-09-11 ENCOUNTER — APPOINTMENT (OUTPATIENT)
Dept: DISASTER EMERGENCY | Facility: CLINIC | Age: 31
End: 2021-09-11

## 2021-09-11 ENCOUNTER — APPOINTMENT (OUTPATIENT)
Dept: ANTEPARTUM | Facility: CLINIC | Age: 31
End: 2021-09-11
Payer: COMMERCIAL

## 2021-09-11 PROCEDURE — 76818 FETAL BIOPHYS PROFILE W/NST: CPT

## 2021-09-11 PROCEDURE — ZZZZZ: CPT

## 2021-09-12 LAB — SARS-COV-2 N GENE NPH QL NAA+PROBE: NOT DETECTED

## 2021-09-15 ENCOUNTER — TRANSCRIPTION ENCOUNTER (OUTPATIENT)
Age: 31
End: 2021-09-15

## 2021-09-15 ENCOUNTER — INPATIENT (INPATIENT)
Facility: HOSPITAL | Age: 31
LOS: 0 days | Discharge: HOME | End: 2021-09-16
Attending: OBSTETRICS & GYNECOLOGY | Admitting: OBSTETRICS & GYNECOLOGY
Payer: COMMERCIAL

## 2021-09-15 VITALS — TEMPERATURE: 98 F

## 2021-09-15 DIAGNOSIS — O42.10 PREMATURE RUPTURE OF MEMBRANES, ONSET OF LABOR MORE THAN 24 HOURS FOLLOWING RUPTURE, UNSPECIFIED WEEKS OF GESTATION: ICD-10-CM

## 2021-09-15 DIAGNOSIS — O42.92 FULL-TERM PREMATURE RUPTURE OF MEMBRANES, UNSPECIFIED AS TO LENGTH OF TIME BETWEEN RUPTURE AND ONSET OF LABOR: ICD-10-CM

## 2021-09-15 LAB
BASOPHILS # BLD AUTO: 0.03 K/UL — SIGNIFICANT CHANGE UP (ref 0–0.2)
BASOPHILS NFR BLD AUTO: 0.4 % — SIGNIFICANT CHANGE UP (ref 0–2)
BLD GP AB SCN SERPL QL: NEGATIVE — SIGNIFICANT CHANGE UP
COVID-19 SPIKE DOMAIN AB INTERP: NEGATIVE — SIGNIFICANT CHANGE UP
COVID-19 SPIKE DOMAIN ANTIBODY RESULT: 0.4 U/ML — SIGNIFICANT CHANGE UP
EOSINOPHIL # BLD AUTO: 0.05 K/UL — SIGNIFICANT CHANGE UP (ref 0–0.5)
EOSINOPHIL NFR BLD AUTO: 0.6 % — SIGNIFICANT CHANGE UP (ref 0–6)
HCT VFR BLD CALC: 37.3 % — SIGNIFICANT CHANGE UP (ref 34.5–45)
HGB BLD-MCNC: 12.7 G/DL — SIGNIFICANT CHANGE UP (ref 11.5–15.5)
IANC: 5.76 K/UL — SIGNIFICANT CHANGE UP (ref 1.5–8.5)
IMM GRANULOCYTES NFR BLD AUTO: 0.6 % — SIGNIFICANT CHANGE UP (ref 0–1.5)
LYMPHOCYTES # BLD AUTO: 1.98 K/UL — SIGNIFICANT CHANGE UP (ref 1–3.3)
LYMPHOCYTES # BLD AUTO: 23.2 % — SIGNIFICANT CHANGE UP (ref 13–44)
MCHC RBC-ENTMCNC: 30.8 PG — SIGNIFICANT CHANGE UP (ref 27–34)
MCHC RBC-ENTMCNC: 34 GM/DL — SIGNIFICANT CHANGE UP (ref 32–36)
MCV RBC AUTO: 90.5 FL — SIGNIFICANT CHANGE UP (ref 80–100)
MONOCYTES # BLD AUTO: 0.67 K/UL — SIGNIFICANT CHANGE UP (ref 0–0.9)
MONOCYTES NFR BLD AUTO: 7.8 % — SIGNIFICANT CHANGE UP (ref 2–14)
NEUTROPHILS # BLD AUTO: 5.76 K/UL — SIGNIFICANT CHANGE UP (ref 1.8–7.4)
NEUTROPHILS NFR BLD AUTO: 67.4 % — SIGNIFICANT CHANGE UP (ref 43–77)
NRBC # BLD: 0 /100 WBCS — SIGNIFICANT CHANGE UP
NRBC # FLD: 0 K/UL — SIGNIFICANT CHANGE UP
PLATELET # BLD AUTO: 160 K/UL — SIGNIFICANT CHANGE UP (ref 150–400)
RBC # BLD: 4.12 M/UL — SIGNIFICANT CHANGE UP (ref 3.8–5.2)
RBC # FLD: 14 % — SIGNIFICANT CHANGE UP (ref 10.3–14.5)
RH IG SCN BLD-IMP: POSITIVE — SIGNIFICANT CHANGE UP
SARS-COV-2 IGG+IGM SERPL QL IA: 0.4 U/ML — SIGNIFICANT CHANGE UP
SARS-COV-2 IGG+IGM SERPL QL IA: NEGATIVE — SIGNIFICANT CHANGE UP
SARS-COV-2 RNA SPEC QL NAA+PROBE: SIGNIFICANT CHANGE UP
T PALLIDUM AB TITR SER: NEGATIVE — SIGNIFICANT CHANGE UP
WBC # BLD: 8.54 K/UL — SIGNIFICANT CHANGE UP (ref 3.8–10.5)
WBC # FLD AUTO: 8.54 K/UL — SIGNIFICANT CHANGE UP (ref 3.8–10.5)

## 2021-09-15 PROCEDURE — 59400 OBSTETRICAL CARE: CPT | Mod: U9

## 2021-09-15 RX ORDER — SODIUM CHLORIDE 9 MG/ML
1000 INJECTION, SOLUTION INTRAVENOUS
Refills: 0 | Status: DISCONTINUED | OUTPATIENT
Start: 2021-09-15 | End: 2021-09-15

## 2021-09-15 RX ORDER — OXYTOCIN 10 UNIT/ML
2 VIAL (ML) INJECTION
Qty: 30 | Refills: 0 | Status: DISCONTINUED | OUTPATIENT
Start: 2021-09-15 | End: 2021-09-16

## 2021-09-15 RX ORDER — ACETAMINOPHEN 500 MG
975 TABLET ORAL
Refills: 0 | Status: DISCONTINUED | OUTPATIENT
Start: 2021-09-15 | End: 2021-09-16

## 2021-09-15 RX ORDER — SODIUM CHLORIDE 9 MG/ML
3 INJECTION INTRAMUSCULAR; INTRAVENOUS; SUBCUTANEOUS EVERY 8 HOURS
Refills: 0 | Status: DISCONTINUED | OUTPATIENT
Start: 2021-09-15 | End: 2021-09-16

## 2021-09-15 RX ORDER — IBUPROFEN 200 MG
1 TABLET ORAL
Qty: 0 | Refills: 0 | DISCHARGE
Start: 2021-09-15

## 2021-09-15 RX ORDER — LANOLIN
1 OINTMENT (GRAM) TOPICAL EVERY 6 HOURS
Refills: 0 | Status: DISCONTINUED | OUTPATIENT
Start: 2021-09-15 | End: 2021-09-16

## 2021-09-15 RX ORDER — MAGNESIUM HYDROXIDE 400 MG/1
30 TABLET, CHEWABLE ORAL
Refills: 0 | Status: DISCONTINUED | OUTPATIENT
Start: 2021-09-15 | End: 2021-09-16

## 2021-09-15 RX ORDER — SIMETHICONE 80 MG/1
80 TABLET, CHEWABLE ORAL EVERY 4 HOURS
Refills: 0 | Status: DISCONTINUED | OUTPATIENT
Start: 2021-09-15 | End: 2021-09-16

## 2021-09-15 RX ORDER — ACETAMINOPHEN 500 MG
3 TABLET ORAL
Qty: 0 | Refills: 0 | DISCHARGE
Start: 2021-09-15

## 2021-09-15 RX ORDER — HYDROCORTISONE 1 %
1 OINTMENT (GRAM) TOPICAL EVERY 6 HOURS
Refills: 0 | Status: DISCONTINUED | OUTPATIENT
Start: 2021-09-15 | End: 2021-09-16

## 2021-09-15 RX ORDER — AER TRAVELER 0.5 G/1
1 SOLUTION RECTAL; TOPICAL EVERY 4 HOURS
Refills: 0 | Status: DISCONTINUED | OUTPATIENT
Start: 2021-09-15 | End: 2021-09-16

## 2021-09-15 RX ORDER — DIBUCAINE 1 %
1 OINTMENT (GRAM) RECTAL EVERY 6 HOURS
Refills: 0 | Status: DISCONTINUED | OUTPATIENT
Start: 2021-09-15 | End: 2021-09-16

## 2021-09-15 RX ORDER — PRAMOXINE HYDROCHLORIDE 150 MG/15G
1 AEROSOL, FOAM RECTAL EVERY 4 HOURS
Refills: 0 | Status: DISCONTINUED | OUTPATIENT
Start: 2021-09-15 | End: 2021-09-16

## 2021-09-15 RX ORDER — OXYTOCIN 10 UNIT/ML
333.33 VIAL (ML) INJECTION
Qty: 20 | Refills: 0 | Status: DISCONTINUED | OUTPATIENT
Start: 2021-09-15 | End: 2021-09-16

## 2021-09-15 RX ORDER — KETOROLAC TROMETHAMINE 30 MG/ML
30 SYRINGE (ML) INJECTION ONCE
Refills: 0 | Status: DISCONTINUED | OUTPATIENT
Start: 2021-09-15 | End: 2021-09-16

## 2021-09-15 RX ORDER — IBUPROFEN 200 MG
600 TABLET ORAL EVERY 6 HOURS
Refills: 0 | Status: DISCONTINUED | OUTPATIENT
Start: 2021-09-15 | End: 2021-09-16

## 2021-09-15 RX ORDER — TETANUS TOXOID, REDUCED DIPHTHERIA TOXOID AND ACELLULAR PERTUSSIS VACCINE, ADSORBED 5; 2.5; 8; 8; 2.5 [IU]/.5ML; [IU]/.5ML; UG/.5ML; UG/.5ML; UG/.5ML
0.5 SUSPENSION INTRAMUSCULAR ONCE
Refills: 0 | Status: DISCONTINUED | OUTPATIENT
Start: 2021-09-15 | End: 2021-09-16

## 2021-09-15 RX ORDER — BENZOCAINE 10 %
1 GEL (GRAM) MUCOUS MEMBRANE EVERY 6 HOURS
Refills: 0 | Status: DISCONTINUED | OUTPATIENT
Start: 2021-09-15 | End: 2021-09-16

## 2021-09-15 RX ORDER — DIPHENHYDRAMINE HCL 50 MG
25 CAPSULE ORAL EVERY 6 HOURS
Refills: 0 | Status: DISCONTINUED | OUTPATIENT
Start: 2021-09-15 | End: 2021-09-16

## 2021-09-15 RX ORDER — SODIUM CHLORIDE 9 MG/ML
1000 INJECTION, SOLUTION INTRAVENOUS ONCE
Refills: 0 | Status: COMPLETED | OUTPATIENT
Start: 2021-09-15 | End: 2021-09-15

## 2021-09-15 RX ADMIN — SODIUM CHLORIDE 125 MILLILITER(S): 9 INJECTION, SOLUTION INTRAVENOUS at 13:57

## 2021-09-15 RX ADMIN — Medication 2 MILLIUNIT(S)/MIN: at 17:56

## 2021-09-15 RX ADMIN — SODIUM CHLORIDE 2000 MILLILITER(S): 9 INJECTION, SOLUTION INTRAVENOUS at 12:00

## 2021-09-15 NOTE — DISCHARGE NOTE OB - CARE PROVIDER_API CALL
Rob Cortes)  MaternalFetal Medicine; Obstetrics and Gynecology  [address] [place] [id number]  Phone: [phone]  Fax: [phone]  Follow Up Time:

## 2021-09-15 NOTE — CHART NOTE - NSCHARTNOTEFT_GEN_A_CORE
In room to check patient due to increased vaginal pressure.    VE: 9/100/+1    FHT: 135/mod/+accels/+decels, single variable  [place]: q2-4mins    A/P: 31 y.o. P1 @41+2wga with SROM  -Making cervical change without intervention  -FHT category 2 due to single variable decel. Overall reassuring with moderate variability and accels  -Anticipate ROBERT Salas MD

## 2021-09-15 NOTE — CHART NOTE - NSCHARTNOTEFT_GEN_A_CORE
32 y/o  at 41.2 weeks gestation admitted for ROM and postdate IOL. Currently awaiting LDR room and nurse. Pt reports increased painful contractions, 10 out of 10 on pain scale, and desires pain management.    Category 1 tracing  TOCO: ctx q5-10 mins  VSS    3/80/-2 clear fluid    D/w MD Nowak. Pt approved for epidural placement by MD Nowak. Charge RN made aware. Continue to monitor

## 2021-09-15 NOTE — OB RN PATIENT PROFILE - FLU SEASON?
Detail Level: Detailed
Quality 474: Zoster Vaccination Status: Shingrix Vaccination not Administered or Previously Received, Reason not Otherwise Specified
Quality 131: Pain Assessment And Follow-Up: No documentation of pain assessment, reason not given
Quality 111:Pneumonia Vaccination Status For Older Adults: Pneumococcal Vaccination not Administered or Previously Received, Reason not Otherwise Specified
Quality 130: Documentation Of Current Medications In The Medical Record: Current Medications Documented
Quality 110: Preventive Care And Screening: Influenza Immunization: Influenza immunization was not ordered or administered, reason not given
Yes...

## 2021-09-15 NOTE — OB PROVIDER DELIVERY SUMMARY - NSSELHIDDEN_OBGYN_ALL_OB_FT
[NS_DeliveryAttending1_OBGYN_ALL_OB_FT:[id number]],[NS_DeliveryRN_OBGYN_ALL_OB_FT:[id number]]

## 2021-09-15 NOTE — DISCHARGE NOTE OB - MEDICATION SUMMARY - MEDICATIONS TO TAKE
I will START or STAY ON the medications listed below when I get home from the hospital:    acetaminophen 325 mg oral tablet  -- 3 tab(s) by mouth   -- Indication: For Postpartum    ibuprofen 600 mg oral tablet  -- 1 tab(s) by mouth every 6 hours  -- Indication: For Postpartum    Prenatal Multivitamins with Folic Acid 1 mg oral tablet  -- 1 tab(s) by mouth once a day  -- Indication: For Postpartum

## 2021-09-15 NOTE — DISCHARGE NOTE OB - PATIENT PORTAL LINK FT
You can access the FollowMyHealth Patient Portal offered by [facility] by registering at the following website: [email or web address]. By joining [employer]’s FollowMyHealth portal, you will also be able to view your health information using other applications (apps) compatible with our system.

## 2021-09-15 NOTE — OB PROVIDER TRIAGE NOTE - NSOBPROVIDERNOTE_OBGYN_ALL_OB_FT
pt grossly ruptured  d/w MD Nowak  pt admitted for ROM and postdate IOL  for PO cytotec  routine lab orders  consents obtained  COVID PCR sent on patient; copy of partner's excelsior pass in chart  GBS negative 8/9/21

## 2021-09-15 NOTE — CHART NOTE - NSCHARTNOTEFT_GEN_A_CORE
Pt c/o increasing pressure.    VE: 9.5/100/+1    Will continue current management. Will use peanut ball.    JAE Salas MD

## 2021-09-15 NOTE — OB RN TRIAGE NOTE - NSMATERNALFETALCONCERNS_OBGYN_ALL_OB_FT
Fetal Alert  Trivial tricuspid valve regurgitation.  Genetic testing positive for XYY chromosomes. Given the positive genetic findings a non-urgent outpatient  cardiology follow-up is recommended within ~ 8 week(s) after delivery, or sooner, if there are any clinical cardiac concerns

## 2021-09-15 NOTE — OB PROVIDER TRIAGE NOTE - CURRENT PREGNANCY COMPLICATIONS, OB PROFILE
Trivial tricuspid valve regurgitation.  Genetic testing positive for XYY chromosomes/Fetal Anomalies

## 2021-09-15 NOTE — OB PROVIDER TRIAGE NOTE - HISTORY OF PRESENT ILLNESS
30 y/o  at 41.2 weeks gestation c/o ROM 5am clear/pink tinged fluid, with ctx [id number] pain 6-7 out of 10 on pain scale. Reports good FM. Pt was scheduled for postdate IOL last night. Pt is not COVID vaccinated; partner is COVID vaccinated.    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 Trivial tricuspid valve regurgitation from fetal echo on 21. Recomended non-urgent outpatient  cardiology follow-up is recommended within ~ 8 week(s) after delivery. GBS negative from 21.  NKDA  Current medications:  -PNV  OBGYN history:  -2019  7lbs 15oz IOL PD  -1 SAB @ 10 weeks  Denies medical history  Denies surgical history

## 2021-09-15 NOTE — OB PROVIDER H&P - PROBLEM/PLAN-1
3 Mm Punch Excision Text: A 3 mm punch biopsy was used to excise the lesion to the level of the subcutaneous fat.  Blunt dissection was used to free the lesion from the surrounding tissues and the lesion was removed. DISPLAY PLAN FREE TEXT

## 2021-09-15 NOTE — OB PROVIDER TRIAGE NOTE - NSHPPHYSICALEXAM_GEN_ALL_CORE
SSE: +pooling, +nitrazine, +ferning  SVE: 2.5/70/-3  abdomen: gravid, soft, nontender  TAS: cephalic presentation  EFW: 3402grams  GBS negative 8/9/21  FHT: category 1 tracing  TOCO: ctx q10 mins    Vital Signs Last 24 Hrs  T(C): 36.7 (15 Sep 2021 09:23), Max: 36.7 (15 Sep 2021 09:19)  T(F): 98.1 (15 Sep 2021 09:23), Max: 98.1 (15 Sep 2021 09:23)  HR: 73 (15 Sep 2021 09:50) (65 - 73)  BP: 104/71 (15 Sep 2021 09:50) (104/71 - 113/78)  BP(mean): --  RR: 16 (15 Sep 2021 09:23) (16 - 16)  SpO2: --

## 2021-09-15 NOTE — OB PROVIDER DELIVERY SUMMARY - NSPROVIDERDELIVERYNOTE_OBGYN_ALL_OB_FT
Delivery of liveborn male infant from OA position  Head, shoulders and body delivered easily. Cord clamped and cut after delayed cord clamping.   Placenta delivered spontaneously and intact   2nd degree laceration repaired with vicryl suture, adequate hemostasis noted  Fundus firm

## 2021-09-15 NOTE — DISCHARGE NOTE OB - CARE PLAN
Principal Discharge DX:	Normal delivery with antepartum or postpartum condition  Assessment and plan of treatment:	Nothing per vagina x 6 weeks. No tub soaking or heavy lifting   1

## 2021-09-15 NOTE — OB RN DELIVERY SUMMARY - NSSELHIDDEN_OBGYN_ALL_OB_FT
[NS_DeliveryAttending1_OBGYN_ALL_OB_FT:[id number]],[NS_DeliveryRN_OBGYN_ALL_OB_FT:[id number]] [NS_DeliveryAttending1_OBGYN_ALL_OB_FT:[id number]],[NS_DeliveryRN_OBGYN_ALL_OB_FT:[id number]] [NS_DeliveryAttending1_OBGYN_ALL_OB_FT:[id number]],[NS_DeliveryRN_OBGYN_ALL_OB_FT:[id number]],[NS_CirculateRN2_OBGYN_ALL_OB_FT:[id number]==]

## 2021-09-15 NOTE — OB RN DELIVERY SUMMARY - NS_SEPSISRSKCALC_OBGYN_ALL_OB_FT
EOS calculated successfully. EOS Risk Factor: 0.5/1000 live births ([facility] national incidence); GA=41w2d; Temp=98.6; ROM=14.183; GBS='Negative'; Antibiotics='No antibiotics or any antibiotics < 2 hrs prior to birth'

## 2021-09-15 NOTE — OB PROVIDER H&P - HISTORY OF PRESENT ILLNESS
32 y/o  at 41.2 weeks gestation c/o ROM 5am clear/pink tinged fluid, with ctx [id number] pain 6-7 out of 10 on pain scale. Reports good FM. Pt was scheduled for postdate IOL last night. Pt is not COVID vaccinated; partner is COVID vaccinated.    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 Trivial tricuspid valve regurgitation from fetal echo on 21. Recomended non-urgent outpatient  cardiology follow-up is recommended within ~ 8 week(s) after delivery. GBS negative from 21.  NKDA  Current medications:  -PNV  OBGYN history:  -2019  7lbs 15oz IOL PD  -1 SAB @ 10 weeks  Denies medical history  Denies surgical history

## 2021-09-16 VITALS
HEART RATE: 74 BPM | RESPIRATION RATE: 19 BRPM | DIASTOLIC BLOOD PRESSURE: 64 MMHG | SYSTOLIC BLOOD PRESSURE: 108 MMHG | TEMPERATURE: 98 F | OXYGEN SATURATION: 99 %

## 2021-09-16 NOTE — PROGRESS NOTE ADULT - SUBJECTIVE AND OBJECTIVE BOX
OB Attending Progress Note:  PPD#1    S: 30yo PPD#1 s/p . Patient feels well. Pain is well controlled. She is tolerating a regular diet and passing flatus. She is voiding spontaneously. and ambulating without difficulty. She is breastfeeding. Denies CP/SOB. Denies lightheadedness/dizziness. Denies N/V/D.    O:  Vitals:  Vital Signs Last 24 Hrs  T(C): 36.8 (16 Sep 2021 10:05), Max: 37.3 (15 Sep 2021 19:38)  T(F): 98.2 (16 Sep 2021 10:05), Max: 99.1 (15 Sep 2021 19:38)  HR: 88 (16 Sep 2021 06:00) (61 - 99)  BP: 101/60 (16 Sep 2021 06:00) (89/55 - 124/-)  BP(mean): --  RR: 18 (16 Sep 2021 10:05) (16 - 18)  SpO2: 100% (16 Sep 2021 10:05) (91% - 100%)    MEDICATIONS  (STANDING):  acetaminophen   Tablet .. 975 milliGRAM(s) Oral <User Schedule>  diphtheria/tetanus/pertussis (acellular) Vaccine (ADAcel) 0.5 milliLiter(s) IntraMuscular once  ibuprofen  Tablet. 600 milliGRAM(s) Oral every 6 hours  prenatal multivitamin 1 Tablet(s) Oral daily  sodium chloride 0.9% lock flush 3 milliLiter(s) IV Push every 8 hours      Labs:  Blood type: AB Positive  Rubella IgG: RPR: Negative                          12.7   8.54 >-----------< 160    ( 09-15 @ 10:16 )             37.3                  Physical Exam:  General: NAD  CV: RR  Pulm: Breathing comfortably on RA  Abdomen: soft, non-tender, non-distended, +BS, fundus firm  Vaginal: Lochia wnl  Extremities: No erythema/edema    A/P: 30yo PPD#1 s/p .   - Pain well controlled, continue current pain regimen  - Increase ambulation, SCDs when not ambulating  - Continue regular diet  - Discharge planning today      Nicole Shultz MD

## 2021-10-27 ENCOUNTER — APPOINTMENT (OUTPATIENT)
Dept: OBGYN | Facility: CLINIC | Age: 31
End: 2021-10-27

## 2021-10-27 VITALS — WEIGHT: 137 LBS | SYSTOLIC BLOOD PRESSURE: 115 MMHG | DIASTOLIC BLOOD PRESSURE: 77 MMHG | BODY MASS INDEX: 26.76 KG/M2

## 2023-05-17 NOTE — OB PROVIDER DELIVERY SUMMARY - AMNIOTIC FLUID ODOR, LABOR
normal
Patient presented to the ED c/o n/v/d. Patient reports symptoms began yesterday morning. Patient reports herself and her two children have the same symptoms. All three unable to tolerate PO intake at this time. Patient denies pmhx for herself and her children at this time. Patient reports she is 22 weeks pregnant. L&D made aware of patients arrival to ED. Patient does not appear to be in any distress at this time.

## 2025-03-26 NOTE — OB RN PATIENT PROFILE - PRO PRENATAL LABS ORI SOURCE HBSAG
Caller: Zeinab Rordiguez    Relationship: Self    Best call back number: [phone]     Requested Prescriptions:   Requested Prescriptions     Pending Prescriptions Disp Refills    PARoxetine (PAXIL) 20 MG tablet 90 tablet 1     Sig: Take 1 tablet by mouth Every Morning.        Pharmacy where request should be sent: [address] [phone] [facility] [phone]      Last office visit with prescribing clinician: 2/12/2025   Last telemedicine visit with prescribing clinician: Visit date not found   Next office visit with prescribing clinician: 6/12/2025     Additional details provided by patient: PATIENT HAS 5 DOSES LEFT     Does the patient have less than a 3 day supply:  [] Yes  [x] No    Would you like a call back once the refill request has been completed: [x] Yes [] No    If the office needs to give you a call back, can they leave a voicemail: [x] Yes [] No    Veronica Mendez Rep   03/26/25 16:05 EDT        hard copy, drawn during this pregnancy